# Patient Record
Sex: MALE | Race: WHITE | NOT HISPANIC OR LATINO | Employment: FULL TIME | URBAN - NONMETROPOLITAN AREA
[De-identification: names, ages, dates, MRNs, and addresses within clinical notes are randomized per-mention and may not be internally consistent; named-entity substitution may affect disease eponyms.]

---

## 2017-01-12 ENCOUNTER — HOSPITAL ENCOUNTER (EMERGENCY)
Facility: HOSPITAL | Age: 22
Discharge: HOME | End: 2017-01-12
Attending: EMERGENCY MEDICINE

## 2017-01-12 VITALS
OXYGEN SATURATION: 100 % | SYSTOLIC BLOOD PRESSURE: 131 MMHG | WEIGHT: 158 LBS | RESPIRATION RATE: 18 BRPM | HEART RATE: 81 BPM | TEMPERATURE: 98 F | DIASTOLIC BLOOD PRESSURE: 82 MMHG

## 2017-01-12 DIAGNOSIS — S00.411A ABRASION OF RIGHT EAR CANAL, INITIAL ENCOUNTER: Primary | ICD-10-CM

## 2017-01-12 PROCEDURE — 99282 EMERGENCY DEPT VISIT SF MDM: CPT

## 2017-01-12 RX ADMIN — LIDOCAINE HYDROCHLORIDE 15 ML: 20 SOLUTION ORAL; TOPICAL at 08:32

## 2017-08-17 ENCOUNTER — HOSPITAL ENCOUNTER (EMERGENCY)
Facility: HOSPITAL | Age: 22
Discharge: HOME | End: 2017-08-17
Attending: EMERGENCY MEDICINE | Admitting: EMERGENCY MEDICINE
Payer: COMMERCIAL

## 2017-08-17 VITALS
WEIGHT: 152 LBS | HEART RATE: 98 BPM | SYSTOLIC BLOOD PRESSURE: 135 MMHG | DIASTOLIC BLOOD PRESSURE: 80 MMHG | OXYGEN SATURATION: 100 % | RESPIRATION RATE: 18 BRPM | TEMPERATURE: 98.3 F

## 2017-08-17 DIAGNOSIS — S39.012A LUMBAR SPINE STRAIN: Primary | ICD-10-CM

## 2017-08-17 PROCEDURE — 99283 EMERGENCY DEPT VISIT LOW MDM: CPT

## 2017-08-17 RX ORDER — METHOCARBAMOL 500 MG/1
750 TABLET, FILM COATED ORAL EVERY 6 HOURS PRN
Status: DISCONTINUED | OUTPATIENT
Start: 2017-08-17 | End: 2017-08-17 | Stop reason: HOSPADM

## 2017-08-17 RX ORDER — METHOCARBAMOL 750 MG/1
750 TABLET, FILM COATED ORAL 4 TIMES DAILY PRN
Qty: 30 TABLET | Refills: 0 | Status: SHIPPED | OUTPATIENT
Start: 2017-08-17 | End: 2017-11-16

## 2017-08-17 RX ADMIN — METHOCARBAMOL 750 MG: 500 TABLET ORAL at 20:30

## 2017-11-16 ENCOUNTER — HOSPITAL ENCOUNTER (EMERGENCY)
Facility: HOSPITAL | Age: 22
Discharge: HOME | End: 2017-11-16
Admitting: EMERGENCY MEDICINE
Payer: COMMERCIAL

## 2017-11-16 ENCOUNTER — APPOINTMENT (EMERGENCY)
Dept: CT IMAGING | Facility: HOSPITAL | Age: 22
End: 2017-11-16
Payer: COMMERCIAL

## 2017-11-16 VITALS
WEIGHT: 150.2 LBS | HEIGHT: 68 IN | TEMPERATURE: 98.2 F | BODY MASS INDEX: 22.76 KG/M2 | HEART RATE: 78 BPM | RESPIRATION RATE: 17 BRPM | DIASTOLIC BLOOD PRESSURE: 81 MMHG | SYSTOLIC BLOOD PRESSURE: 118 MMHG | OXYGEN SATURATION: 98 %

## 2017-11-16 DIAGNOSIS — N20.0 RENAL CALCULI: Primary | ICD-10-CM

## 2017-11-16 LAB
ALBUMIN SERPL BCP-MCNC: 4.5 G/DL (ref 3.5–5)
ALP SERPL-CCNC: 103 U/L (ref 46–116)
ALT SERPL W P-5'-P-CCNC: 31 U/L (ref 12–78)
ANION GAP SERPL CALCULATED.3IONS-SCNC: 8 MMOL/L (ref 4–13)
AST SERPL W P-5'-P-CCNC: 21 U/L (ref 5–45)
BACTERIA UR QL AUTO: ABNORMAL /HPF
BASOPHILS # BLD AUTO: 0.04 THOUSANDS/ΜL (ref 0–0.1)
BASOPHILS NFR BLD AUTO: 1 % (ref 0–1)
BILIRUB SERPL-MCNC: 0.4 MG/DL (ref 0.2–1)
BILIRUB UR QL STRIP: NEGATIVE
BUN SERPL-MCNC: 21 MG/DL (ref 5–25)
CALCIUM SERPL-MCNC: 9.7 MG/DL (ref 8.3–10.1)
CHLORIDE SERPL-SCNC: 102 MMOL/L (ref 100–108)
CLARITY UR: CLEAR
CO2 SERPL-SCNC: 31 MMOL/L (ref 21–32)
COLOR UR: ABNORMAL
CREAT SERPL-MCNC: 1.09 MG/DL (ref 0.6–1.3)
EOSINOPHIL # BLD AUTO: 0.19 THOUSAND/ΜL (ref 0–0.61)
EOSINOPHIL NFR BLD AUTO: 3 % (ref 0–6)
ERYTHROCYTE [DISTWIDTH] IN BLOOD BY AUTOMATED COUNT: 13.7 % (ref 11.6–15.1)
GFR SERPL CREATININE-BSD FRML MDRD: 94 ML/MIN/1.73SQ M
GLUCOSE SERPL-MCNC: 85 MG/DL (ref 65–140)
GLUCOSE UR STRIP-MCNC: NEGATIVE MG/DL
HCT VFR BLD AUTO: 43.5 % (ref 36.5–49.3)
HGB BLD-MCNC: 15.4 G/DL (ref 12–17)
HGB UR QL STRIP.AUTO: ABNORMAL
HOLD SPECIMEN: NORMAL
KETONES UR STRIP-MCNC: NEGATIVE MG/DL
LEUKOCYTE ESTERASE UR QL STRIP: NEGATIVE
LIPASE SERPL-CCNC: 106 U/L (ref 73–393)
LYMPHOCYTES # BLD AUTO: 2.02 THOUSANDS/ΜL (ref 0.6–4.47)
LYMPHOCYTES NFR BLD AUTO: 29 % (ref 14–44)
MCH RBC QN AUTO: 28.3 PG (ref 26.8–34.3)
MCHC RBC AUTO-ENTMCNC: 35.4 G/DL (ref 31.4–37.4)
MCV RBC AUTO: 80 FL (ref 82–98)
MONOCYTES # BLD AUTO: 0.49 THOUSAND/ΜL (ref 0.17–1.22)
MONOCYTES NFR BLD AUTO: 7 % (ref 4–12)
NEUTROPHILS # BLD AUTO: 4.21 THOUSANDS/ΜL (ref 1.85–7.62)
NEUTS SEG NFR BLD AUTO: 60 % (ref 43–75)
NITRITE UR QL STRIP: NEGATIVE
NON-SQ EPI CELLS URNS QL MICRO: ABNORMAL /HPF
PH UR STRIP.AUTO: 6 [PH] (ref 4.5–8)
PLATELET # BLD AUTO: 186 THOUSANDS/UL (ref 149–390)
PMV BLD AUTO: 9.5 FL (ref 8.9–12.7)
POTASSIUM SERPL-SCNC: 3.4 MMOL/L (ref 3.5–5.3)
PROT SERPL-MCNC: 8.5 G/DL (ref 6.4–8.2)
PROT UR STRIP-MCNC: NEGATIVE MG/DL
RBC # BLD AUTO: 5.45 MILLION/UL (ref 3.88–5.62)
RBC #/AREA URNS AUTO: ABNORMAL /HPF
SODIUM SERPL-SCNC: 141 MMOL/L (ref 136–145)
SP GR UR STRIP.AUTO: 1.01 (ref 1–1.03)
UROBILINOGEN UR QL STRIP.AUTO: 0.2 E.U./DL
WBC # BLD AUTO: 6.95 THOUSAND/UL (ref 4.31–10.16)
WBC #/AREA URNS AUTO: ABNORMAL /HPF

## 2017-11-16 PROCEDURE — 74176 CT ABD & PELVIS W/O CONTRAST: CPT

## 2017-11-16 PROCEDURE — 80053 COMPREHEN METABOLIC PANEL: CPT | Performed by: PHYSICIAN ASSISTANT

## 2017-11-16 PROCEDURE — 85025 COMPLETE CBC W/AUTO DIFF WBC: CPT | Performed by: PHYSICIAN ASSISTANT

## 2017-11-16 PROCEDURE — 36415 COLL VENOUS BLD VENIPUNCTURE: CPT | Performed by: PHYSICIAN ASSISTANT

## 2017-11-16 PROCEDURE — 83690 ASSAY OF LIPASE: CPT | Performed by: PHYSICIAN ASSISTANT

## 2017-11-16 PROCEDURE — 99284 EMERGENCY DEPT VISIT MOD MDM: CPT

## 2017-11-16 PROCEDURE — 81001 URINALYSIS AUTO W/SCOPE: CPT | Performed by: PHYSICIAN ASSISTANT

## 2017-11-16 RX ORDER — HYDROCODONE BITARTRATE AND ACETAMINOPHEN 5; 325 MG/1; MG/1
1 TABLET ORAL EVERY 6 HOURS PRN
Qty: 8 TABLET | Refills: 0 | Status: SHIPPED | OUTPATIENT
Start: 2017-11-16 | End: 2017-11-26

## 2017-11-16 RX ORDER — TAMSULOSIN HYDROCHLORIDE 0.4 MG/1
0.4 CAPSULE ORAL
Qty: 7 CAPSULE | Refills: 0 | Status: SHIPPED | OUTPATIENT
Start: 2017-11-16

## 2017-11-16 RX ORDER — TAMSULOSIN HYDROCHLORIDE 0.4 MG/1
0.4 CAPSULE ORAL ONCE
Status: COMPLETED | OUTPATIENT
Start: 2017-11-16 | End: 2017-11-16

## 2017-11-16 RX ADMIN — TAMSULOSIN HYDROCHLORIDE 0.4 MG: 0.4 CAPSULE ORAL at 20:48

## 2017-11-17 NOTE — ED PROVIDER NOTES
History  Chief Complaint   Patient presents with    Flank Pain     Pt reports left flank pain started this evening  Pt reports hx of kidney stones and "it felt similar to that pain"     Patient presents to the emergency department today offering a history of left flank pain radiating into the left lower portion of the abdomen  Patient states the symptoms commenced earlier today when he woke up  Symptoms have since dissipated  He denies any urinary urgency frequency or burning  He denies incontinence of urine or stool  Denies lower extremity pain  No history of fever  History of renal calculi  None       Past Medical History:   Diagnosis Date    Kidney stones        Past Surgical History:   Procedure Laterality Date    EYE SURGERY      TONSILLECTOMY AND ADENOIDECTOMY         History reviewed  No pertinent family history  I have reviewed and agree with the history as documented  Social History   Substance Use Topics    Smoking status: Never Smoker    Smokeless tobacco: Never Used    Alcohol use Yes      Comment: occasionally        Review of Systems   Constitutional: Negative  HENT: Negative  Eyes: Negative  Respiratory: Negative  Cardiovascular: Negative  Gastrointestinal: Positive for abdominal pain  Endocrine: Negative  Genitourinary: Negative  Musculoskeletal: Positive for back pain  Left flank pain   Skin: Negative  Allergic/Immunologic: Negative  Neurological: Negative  Hematological: Negative  Psychiatric/Behavioral: Negative  All other systems reviewed and are negative        Physical Exam  ED Triage Vitals [11/16/17 1819]   Temperature Pulse Respirations Blood Pressure SpO2   98 2 °F (36 8 °C) 77 17 117/77 99 %      Temp Source Heart Rate Source Patient Position - Orthostatic VS BP Location FiO2 (%)   Temporal Monitor Sitting Left arm --      Pain Score       6           Orthostatic Vital Signs  Vitals:    11/16/17 1819 11/16/17 1845 11/16/17 1900 11/16/17 1930   BP: 117/77 124/80 114/78 113/76   Pulse: 77 89 75 78   Patient Position - Orthostatic VS: Sitting Sitting Lying Lying       Physical Exam   Constitutional: He is oriented to person, place, and time  He appears well-developed and well-nourished  No distress  HENT:   Head: Normocephalic and atraumatic  Eyes: Pupils are equal, round, and reactive to light  Neck: Normal range of motion  Cardiovascular: Normal rate and regular rhythm  Exam reveals no gallop and no friction rub  No murmur heard  Pulmonary/Chest: Effort normal and breath sounds normal  No respiratory distress  He has no wheezes  He has no rales  He exhibits no tenderness  Abdominal: Soft  Bowel sounds are normal  He exhibits no distension and no mass  There is no tenderness  There is no rebound and no guarding  No hernia  No CVA tenderness   Musculoskeletal: Normal range of motion  He exhibits no edema, tenderness or deformity  Neurological: He is alert and oriented to person, place, and time  Skin: Skin is warm  Capillary refill takes less than 2 seconds  He is not diaphoretic  Psychiatric: He has a normal mood and affect  Vitals reviewed        ED Medications  Medications   tamsulosin (FLOMAX) capsule 0 4 mg (not administered)       Diagnostic Studies  Results Reviewed     Procedure Component Value Units Date/Time    Comprehensive metabolic panel [[id number]]  (Abnormal) Collected:  11/16/17 1842    Lab Status:  Final result Specimen:  Blood from Arm, Right Updated:  11/16/17 1907     Sodium 141 mmol/L      Potassium 3 4 (L) mmol/L      Chloride 102 mmol/L      CO2 31 mmol/L      Anion Gap 8 mmol/L      BUN 21 mg/dL      Creatinine 1 09 mg/dL      Glucose 85 mg/dL      Calcium 9 7 mg/dL      AST 21 U/L      ALT 31 U/L      Alkaline Phosphatase 103 U/L      Total Protein 8 5 (H) g/dL      Albumin 4 5 g/dL      Total Bilirubin 0 40 mg/dL      eGFR 94 ml/min/1 73sq m     Narrative:         National Kidney Disease Education Program recommendations are as follows:  GFR calculation is accurate only with a steady state creatinine  Chronic Kidney disease less than 60 ml/min/1 73 sq  meters  Kidney failure less than 15 ml/min/1 73 sq  meters      Lipase [[id number]]  (Normal) Collected:  11/16/17 1842    Lab Status:  Final result Specimen:  Blood from Arm, Right Updated:  11/16/17 1907     Lipase 106 u/L     Urine Microscopic [[id number]]  (Abnormal) Collected:  11/16/17 1843    Lab Status:  Final result Specimen:  Urine from Urine, Clean Catch Updated:  11/16/17 1858     RBC, UA 10-20 (A) /hpf      WBC, UA None Seen /hpf      Epithelial Cells Occasional /hpf      Bacteria, UA Occasional /hpf     UA w Reflex to Microscopic w Reflex to Culture [[id number]]  (Abnormal) Collected:  11/16/17 1843    Lab Status:  Final result Specimen:  Urine from Urine, Clean Catch Updated:  11/16/17 1852     Color, UA Light Yellow     Clarity, UA Clear     Specific Gravity, UA 1 010     pH, UA 6 0     Leukocytes, UA Negative     Nitrite, UA Negative     Protein, UA Negative mg/dl      Glucose, UA Negative mg/dl      Ketones, UA Negative mg/dl      Urobilinogen, UA 0 2 E U /dl      Bilirubin, UA Negative     Blood, UA Large (A)    CBC and differential [[id number]]  (Abnormal) Collected:  11/16/17 1842    Lab Status:  Final result Specimen:  Blood from Arm, Right Updated:  11/16/17 1850     WBC 6 95 Thousand/uL      RBC 5 45 Million/uL      Hemoglobin 15 4 g/dL      Hematocrit 43 5 %      MCV 80 (L) fL      MCH 28 3 pg      MCHC 35 4 g/dL      RDW 13 7 %      MPV 9 5 fL      Platelets 323 Thousands/uL      Neutrophils Relative 60 %      Lymphocytes Relative 29 %      Monocytes Relative 7 %      Eosinophils Relative 3 %      Basophils Relative 1 %      Neutrophils Absolute 4 21 Thousands/µL      Lymphocytes Absolute 2 02 Thousands/µL      Monocytes Absolute 0 49 Thousand/µL      Eosinophils Absolute 0 19 Thousand/µL      Basophils Absolute 0 04 Thousands/µL                  CT renal stone study abdomen pelvis wo contrast   Final Result by Maxi Marley MD (11/16 2018)      Mild left-sided hydronephrosis, the cause which appears to be a 5 mm calculus within the proximal ureter  Bilateral subcentimeter nonobstructing intrarenal calculi  Workstation performed: [id number]                    Procedures  Procedures       Phone Contacts  ED Phone Contact    ED Course  ED Course as of Nov 16 2035   Thu Nov 16, 2017   1939 Sodium: [address] Potassium: (!) 3 4   1939 Chloride: 102   1939 CO2: 31   1939 Anion Gap: 8   1939 BUN: 21   1939 Creatinine: 1 09   1940 Glucose: 85   1940 Calcium: 9 7   1940 AST: 21   1940 ALT: 31   1940 Alkaline Phosphatase: 103   1940 Total Protein: (!) 8 5                               MDM  CritCare Time    Disposition  Final diagnoses:   Renal calculi     Time reflects when diagnosis was documented in both MDM as applicable and the Disposition within this note     Time User Action Codes Description Comment    11/16/2017  8:28 PM July HERNANDEZ Add [N20 0] Renal calculi       ED Disposition     ED Disposition Condition Comment    Discharge  [address] [facility] discharge to home/self care      Condition at discharge: Good        Follow-up Information     Follow up With Specialties Details [facility] [address]  for Urology at [address] [address] [facility] [facility] [address]  [address] [place] [phone]        Patient's Medications   Discharge Prescriptions    HYDROCODONE-ACETAMINOPHEN (NORCO) 5-325 MG PER TABLET    Take 1 tablet by mouth every 6 (six) hours as needed for pain for up to 10 days Max Daily Amount: 4 tablets       Start Date: 11/16/2017End Date: 11/26/2017       Order Dose: 1 tablet       Quantity: 8 tablet    Refills: 0    TAMSULOSIN (FLOMAX) 0 4 MG    Take 1 capsule by mouth daily with dinner       Start Date: 11/16/2017End Date: --       Order Dose: 0 4 mg Quantity: 7 capsule    Refills: 0     No discharge procedures on file      ED Provider  Electronically Signed by           Cait Dyer PA-C  11/16/17 1498

## 2017-11-17 NOTE — DISCHARGE INSTRUCTIONS
How to Strain Your Urine   WHAT YOU NEED TO KNOW:   Urinate into a strainer (funnel with a fine mesh on the bottom) or glass jar to collect kidney stones  DISCHARGE INSTRUCTIONS:   Medicines:   · Pain medicine: You may be given medicine to take away or decrease pain  Do not wait until the pain is severe before you take your medicine  · NSAIDs:  These medicines decrease swelling, pain, and fever  NSAIDs are available without a doctor's order  Ask which medicine is right for you  Ask how much to take and when to take it  Take as directed  NSAIDs can cause stomach bleeding and kidney problems if not taken correctly  · Nausea medicine: This medicine calms your stomach and prevents or controls vomiting  · Take your medicine as directed  Contact your healthcare provider if you think your medicine is not helping or if you have side effects  Tell him of her if you are allergic to any medicine  Keep a list of the medicines, vitamins, and herbs you take  Include the amounts, and when and why you take them  Bring the list or the pill bottles to follow-up visits  Carry your medicine list with you in case of an emergency  Drink liquids as directed:  Drink about 3 liters of liquids each day, or as directed  That equals about 12 glasses of water or fruit juice  Half of your total daily liquids should be water  Limit coffee, tea, and soda to 2 cups daily  Your urine will be pale and clear if you are drinking enough liquid  Self-care:   · Activity:  Exercise, such as walking, may help decrease your pain  · Avoid heat:  Heat may cause you to sweat, urinate less, and become dehydrated  Follow up with your healthcare provider or urologist as directed:  Write down your questions so you remember to ask them during your visits  Contact your healthcare provider or urologist if:   · You have a fever and chills  · Your urine looks cloudy or has a bad smell  · You have burning pain when you urinate       · You have trouble urinating  · You are vomiting and it does not get better, even after you take medicine  · You have questions or concerns about your condition or care  Return to the emergency department if:   · You are not able to urinate  · You have severe pain in your lower abdomen or side  · Your heart flutters or beats faster than usual   © 2017 [address] [employer] Information is for End User's use only and may not be sold, redistributed or otherwise used for commercial purposes  All illustrations and images included in CareNotes® are the copyrighted property of A D A M , Inc  or Ahsan Klein  The above information is an  only  It is not intended as medical advice for individual conditions or treatments  Talk to your doctor, nurse or pharmacist before following any medical regimen to see if it is safe and effective for you  Kidney Stones   WHAT YOU NEED TO KNOW:   Kidney stones form in the urinary system when the water and waste in your urine are out of balance  When this happens, certain types of waste crystals separate from the urine  The crystals build up and form kidney stones  You may have 1 or more kidney stones  DISCHARGE INSTRUCTIONS:   Return to the emergency department if:   · You have vomiting that is not relieved by medicine  Contact your healthcare provider if:   · You have a fever  · You have trouble passing urine  · You see blood in your urine  · You have severe pain  · You have any questions or concerns about your condition or care  Medicines:   · NSAIDs , such as ibuprofen, help decrease swelling, pain, and fever  This medicine is available with or without a doctor's order  NSAIDs can cause stomach bleeding or kidney problems in certain people  If you take blood thinner medicine, always ask your healthcare provider if NSAIDs are safe for you  Always read the medicine label and follow directions      · Prescription medicine  may be given  Ask how to take this medicine safely  · Medicines  to balance your electrolytes may be needed  · Take your medicine as directed  Contact your healthcare provider if you think your medicine is not helping or if you have side effects  Tell him or her if you are allergic to any medicine  Keep a list of the medicines, vitamins, and herbs you take  Include the amounts, and when and why you take them  Bring the list or the pill bottles to follow-up visits  Carry your medicine list with you in case of an emergency  Follow up with your healthcare provider as directed: You may need to return for more tests  Write down your questions so you remember to ask them during your visits  Self-care:   · Drink plenty of liquids  Your healthcare provider may tell you to drink at least 8 to 12 (eight-ounce) cups of liquids each day  This helps flush out the kidney stones when you urinate  Water is the best liquid to drink  · Strain your urine every time you go to the bathroom  Urinate through a strainer or a piece of thin cloth to catch the stones  Take the stones to your healthcare provider so they can be sent to the lab for tests  This will help your healthcare providers plan the best treatment for you  · Eat a variety of healthy foods  Healthy foods include fruits, vegetables, whole-grain breads, low-fat dairy products, beans, and fish  You may need to limit how much sodium (salt) or protein you eat  Ask for information about the best foods for you  · Stay active  Your stones may pass more easily by if you stay active  Ask about the best activities for you  After you pass your kidney stones:  Once you have passed your kidney stones, your healthcare provider may  order a 24-hour urine test  Results from a 24-hour urine test will help your healthcare provider plan ways to prevent more stones from forming   If you are told to do a 24-hour test, your healthcare provider will give you more instructions  © 2017 [address] [facility] Information is for End User's use only and may not be sold, redistributed or otherwise used for commercial purposes  All illustrations and images included in CareNotes® are the copyrighted property of A D A M , Inc  or Ahsan Klein  The above information is an  only  It is not intended as medical advice for individual conditions or treatments  Talk to your doctor, nurse or pharmacist before following any medical regimen to see if it is safe and effective for you

## 2017-11-22 ENCOUNTER — TRANSCRIBE ORDERS (OUTPATIENT)
Dept: ADMINISTRATIVE | Facility: HOSPITAL | Age: 22
End: 2017-11-22

## 2017-11-22 ENCOUNTER — ALLSCRIPTS OFFICE VISIT (OUTPATIENT)
Dept: OTHER | Facility: OTHER | Age: 22
End: 2017-11-22

## 2017-11-22 DIAGNOSIS — N20.0 CALCULUS OF KIDNEY: Primary | ICD-10-CM

## 2017-11-22 LAB
CLARITY UR: NORMAL
COLOR UR: YELLOW
GLUCOSE (HISTORICAL): NORMAL
HGB UR QL STRIP.AUTO: NORMAL
KETONES UR STRIP-MCNC: NORMAL MG/DL
LEUKOCYTE ESTERASE UR QL STRIP: NORMAL
NITRITE UR QL STRIP: NORMAL
PH UR STRIP.AUTO: 6 [PH]
PROT UR STRIP-MCNC: NORMAL MG/DL
SP GR UR STRIP.AUTO: 1.02

## 2017-11-23 NOTE — CONSULTS
Assessment  1  Calculus of kidney (592 0) (N20 0)    Plan  Calculus of kidney    · Tamsulosin HCl - 0 4 MG Oral Capsule (Flomax); TAKE 1 CAPSULE Bedtime   Rx By: Karthik Sheldon; Dispense: 0 Days ; #:30 Capsule; Refill: 1;For: Calculus of kidney; CHRISTOS = N; Verified Transmission to [facility]; Last Updated By: System[employer]; 11/22/2017 3:02:38 PM   · * XR ABDOMEN 1 VIEW KUB; Status:Active; Requested for:[id number];    Perform:[facility]; GTI:[id number]; Ordered;of kidney; Ordered By:Grayson Alvarez;   · Urine Dip Non-Automated- POC; Status:Complete - Retrospective By ProtocolAuthorization;   Done: [id number] 02:40PM   Performed: In Office; [phone]; Last Updated By:Marianne Morris; 11/22/2017 2:41:01 PM;Ordered; For:Calculus of kidney; Ordered By:Grayson Alvarez;   · [facility]; Status:Hold For - Scheduling; Requested for:[id number];    Perform:[facility]; Order Comments:with ureteral jets; QCJ:[id number]; Ordered;of kidney; Ordered By:Grayson Alvarez;   · Follow-up visit in 1 month Evaluation and Treatment  Follow-up-with Margie  Status: HoldFor - Scheduling  Requested for: [id number]   Ordered;Calculus of kidney; Ordered By: Karthik Sheldon Performed:  Due: [id number]    Discussion/Summary  Discussion Summary:   Patient has a proximal stone which given his symptoms appears to be moving down towards the bladder  I have given the patient urine strainers and a cup should he pass his stone  Patient is on Flomax currently and needs a refill which I have provided  The patient would like to try to pass stone on his own as he has been able to do in the past  As such, I will see him back in 1 month with an updated x-ray and ultrasound  reviewed with the patient urgent stones symptoms that should prompt emergent evaluation   These include intractable flank pain that does not respond to oral medications, nausea and vomiting that prevents intake of oral fluids, fevers greater than 100 3 measured by thermometer, or large amounts of blood in the urine  reviewed with the patient some general lifestyle and dietary modifications that can improve and prevent stone formation  This includes a large volume of water intake, approximately 1 5-2 L per day  Addition of citric acid with homemade lemonade or orange juice can help to it inhibit stone formation  Decreased sodium in the form of table salt and as sodium in prepared foods as well as decreased animal protein with approximately 1 palm-sized portion per meal can both lead to decrease in overall stone formation  the patient is able to maintain some of these lifestyle modifications and still has recurrent stone formation we discussed the need for a metabolic stone evaluation  patient does not like to use narcotic medication and uses Motrin and tramadol  I have not given him any prescriptions today other than the Flomax  Chief Complaint  Chief Complaint Free Text Note Form: Pt presents to office for consult for nephrolithiasis  Review of Systems  Complete-Male Urology:  Genitourinary: Empty sensation-- and-- stream quality good, but-- no dysuria,-- no urinary hesitancy,-- no hematuria,-- no incontinence-- and-- no feelings of urinary urgency--   The patient presents with complaints of episodes of nocturia  Episodes have been occurring 2 times  Active Problems  1  Calculus of kidney (592 0) (N20 0)   2  Cystic acne (706 1) (L70 0)   3  Fungal nail infection (110 1) (B35 1)   4  Need for influenza vaccination (V04 81) (Z23)   5  Pityriasis rosea (696 3) (L42)    Surgical History    1  History of Eye Surgery   2  History of Tonsillectomy With Adenoidectomy    Family History  Mother    1  Family history of Junior-Danlos syndrome   2  Family history of arthritis (V17 7) (Z82 61)  Father    3  Family history of type 2 diabetes mellitus (V18 0) (Z83 3)   4  Family history of Healthy adult  Grandparent    5   Family history of type 2 diabetes mellitus (V18 0) (Z83 3)    Social History     · Never a smoker   · No alcohol use   · Single    Current Meds   1  Flomax CAPS; Therapy: (Recorded:22Nov2017) to Recorded    Allergies  1  No Known Drug Allergies    Vitals  Vital Signs    Recorded: 22Nov2017 02:33PM   Heart Rate 80   Respiration 16   Systolic 964   Diastolic 70   Height 5 ft 9 in   Weight 152 lb    BMI Calculated 22 45   BSA Calculated 1 84       Physical Exam   Constitutional  General appearance: No acute distress, well appearing and well nourished  Pulmonary  Respiratory effort: No increased work of breathing or signs of respiratory distress  Cardiovascular  Examination of extremities for edema and/or varicosities: Normal    Abdomen  Abdomen: Non-tender, no masses  Musculoskeletal  Gait and station: Normal    Skin  Skin and subcutaneous tissue: Normal without rashes or lesions  Results/Data  Urine Dip Non-Automated- POC [id number] 02:40PM Jerrell Sams     Test Name Result Flag Reference   Color Yellow       Clarity Transparent     Leukocytes neg     Nitrite neg     Blood neg     Protein neg     Ph 6     Specific Gravity 1 020     Ketone neg     Glucose neg         Diagnostic Studies Reviewed Urology:  CT Scan Review CT ABDOMEN AND PELVIS WITHOUT IV CONTRAST - LOW DOSE RENAL STONELeft lower quadrant pain  None  Low dose thin section CT examination of the abdomen and pelvis was performed without intravenous or oral contrast according to a protocol specifically designed to evaluate for urinary tract calculus  Reformatted images were created in axial,  sagittal, and coronal planes  Evaluation for pathology in the abdomen and pelvis that is unrelated to urinary tract calculi is limited  dose length product (DLP) for this visit: 134 23 mGy-cm   This examination, like all CT scans performed in the [facility], was performed utilizing techniques to minimize radiation dose exposure, including the use of iterative  reconstruction and automated exposure control  KIDNEY AND URETER:are multiple nonobstructing intrarenal calculi measuring no greater than 3 mm  There is a 4 5 cm lower pole cyst relates a focus of thin peripheral calcification  hydronephrosis or hydroureter  No perinephric collection  KIDNEY AND URETER:is mild hydronephrosis, the cause of which appears to be a 5 mm calculus within the proximal ureter, just distal to the UPJ hydronephrosis or hydroureter  No perinephric collection  BLADDER:significant abnormality in the visualized lung bases  low radiation dose noncontrast CT evaluation demonstrates no clinically significant abnormality of liver, spleen, pancreas, or adrenal glands  calcified gallstones or gallbladder wall thickening noted  ascites or lymphadenopathy  loops appear unremarkable  appendix is well seen and there is no evidence of acute appendicitis  acute fracture or destructive osseous lesion is identified  left-sided hydronephrosis, the cause which appears to be a 5 mm calculus within the proximal ureter  subcentimeter nonobstructing intrarenal calculi     Lab Studies Reviewed: Urine [id number] - Reflex for Order [id number] result Â Â Visible to patient: No (Not Released) Next appt: None  Ref Range & Units 11/16/17 1843 FlagUA None Seen, 0-5 /hpf 10-20  AUA None Seen, 0-5, 5-55, 5-65 /hpf Â None SeenCells None Seen, Occasional /hpf Â OccasionalUA None Seen, Occasional /hpf Â Occasional  Collected: 11/16/17 18:43 Last Resulted: 11/16/17 18:58             Signatures   Electronically signed by : JAYLENE Rossi ; Nov 22 2017  3:03PM EST                       (Author)

## 2017-12-28 ENCOUNTER — HOSPITAL ENCOUNTER (OUTPATIENT)
Dept: ULTRASOUND IMAGING | Facility: HOSPITAL | Age: 22
Discharge: HOME | End: 2017-12-28
Attending: UROLOGY
Payer: COMMERCIAL

## 2017-12-28 DIAGNOSIS — N20.0 CALCULUS OF KIDNEY: ICD-10-CM

## 2017-12-28 PROCEDURE — 76770 US EXAM ABDO BACK WALL COMP: CPT

## 2018-01-01 ENCOUNTER — APPOINTMENT (EMERGENCY)
Dept: CT IMAGING | Facility: HOSPITAL | Age: 23
End: 2018-01-01
Payer: COMMERCIAL

## 2018-01-01 ENCOUNTER — HOSPITAL ENCOUNTER (EMERGENCY)
Facility: HOSPITAL | Age: 23
Discharge: HOME | End: 2018-01-02
Attending: EMERGENCY MEDICINE
Payer: COMMERCIAL

## 2018-01-01 DIAGNOSIS — N20.1 LEFT URETERAL CALCULUS: Primary | ICD-10-CM

## 2018-01-01 LAB
BASOPHILS # BLD AUTO: 0.04 THOUSANDS/ΜL (ref 0–0.1)
BASOPHILS NFR BLD AUTO: 1 % (ref 0–1)
BILIRUB UR QL STRIP: NEGATIVE
CLARITY UR: ABNORMAL
COLOR UR: YELLOW
EOSINOPHIL # BLD AUTO: 0.23 THOUSAND/ΜL (ref 0–0.61)
EOSINOPHIL NFR BLD AUTO: 3 % (ref 0–6)
ERYTHROCYTE [DISTWIDTH] IN BLOOD BY AUTOMATED COUNT: 14 % (ref 11.6–15.1)
GLUCOSE UR STRIP-MCNC: NEGATIVE MG/DL
HCT VFR BLD AUTO: 42.9 % (ref 36.5–49.3)
HGB BLD-MCNC: 15.1 G/DL (ref 12–17)
HGB UR QL STRIP.AUTO: ABNORMAL
KETONES UR STRIP-MCNC: NEGATIVE MG/DL
LEUKOCYTE ESTERASE UR QL STRIP: NEGATIVE
LYMPHOCYTES # BLD AUTO: 1.83 THOUSANDS/ΜL (ref 0.6–4.47)
LYMPHOCYTES NFR BLD AUTO: 26 % (ref 14–44)
MCH RBC QN AUTO: 28.5 PG (ref 26.8–34.3)
MCHC RBC AUTO-ENTMCNC: 35.2 G/DL (ref 31.4–37.4)
MCV RBC AUTO: 81 FL (ref 82–98)
MONOCYTES # BLD AUTO: 0.47 THOUSAND/ΜL (ref 0.17–1.22)
MONOCYTES NFR BLD AUTO: 7 % (ref 4–12)
NEUTROPHILS # BLD AUTO: 4.37 THOUSANDS/ΜL (ref 1.85–7.62)
NEUTS SEG NFR BLD AUTO: 63 % (ref 43–75)
NITRITE UR QL STRIP: NEGATIVE
PH UR STRIP.AUTO: 5.5 [PH] (ref 4.5–8)
PLATELET # BLD AUTO: 175 THOUSANDS/UL (ref 149–390)
PMV BLD AUTO: 9.4 FL (ref 8.9–12.7)
PROT UR STRIP-MCNC: ABNORMAL MG/DL
RBC # BLD AUTO: 5.3 MILLION/UL (ref 3.88–5.62)
SP GR UR STRIP.AUTO: >=1.03 (ref 1–1.03)
UROBILINOGEN UR QL STRIP.AUTO: 0.2 E.U./DL
WBC # BLD AUTO: 6.94 THOUSAND/UL (ref 4.31–10.16)

## 2018-01-01 PROCEDURE — 74176 CT ABD & PELVIS W/O CONTRAST: CPT

## 2018-01-01 PROCEDURE — 80053 COMPREHEN METABOLIC PANEL: CPT | Performed by: EMERGENCY MEDICINE

## 2018-01-01 PROCEDURE — 96361 HYDRATE IV INFUSION ADD-ON: CPT

## 2018-01-01 PROCEDURE — 96374 THER/PROPH/DIAG INJ IV PUSH: CPT

## 2018-01-01 PROCEDURE — 81001 URINALYSIS AUTO W/SCOPE: CPT | Performed by: EMERGENCY MEDICINE

## 2018-01-01 PROCEDURE — 85025 COMPLETE CBC W/AUTO DIFF WBC: CPT | Performed by: EMERGENCY MEDICINE

## 2018-01-01 PROCEDURE — 36415 COLL VENOUS BLD VENIPUNCTURE: CPT | Performed by: EMERGENCY MEDICINE

## 2018-01-01 RX ORDER — KETOROLAC TROMETHAMINE 30 MG/ML
30 INJECTION, SOLUTION INTRAMUSCULAR; INTRAVENOUS ONCE
Status: COMPLETED | OUTPATIENT
Start: 2018-01-01 | End: 2018-01-01

## 2018-01-01 RX ADMIN — SODIUM CHLORIDE 1000 ML: 0.9 INJECTION, SOLUTION INTRAVENOUS at 23:49

## 2018-01-01 RX ADMIN — KETOROLAC TROMETHAMINE 30 MG: 30 INJECTION, SOLUTION INTRAMUSCULAR at 23:50

## 2018-01-02 VITALS
SYSTOLIC BLOOD PRESSURE: 114 MMHG | HEART RATE: 82 BPM | WEIGHT: 152 LBS | TEMPERATURE: 98.5 F | OXYGEN SATURATION: 99 % | BODY MASS INDEX: 23.11 KG/M2 | DIASTOLIC BLOOD PRESSURE: 69 MMHG | RESPIRATION RATE: 16 BRPM

## 2018-01-02 LAB
ALBUMIN SERPL BCP-MCNC: 4.2 G/DL (ref 3.5–5)
ALP SERPL-CCNC: 91 U/L (ref 46–116)
ALT SERPL W P-5'-P-CCNC: 34 U/L (ref 12–78)
ANION GAP SERPL CALCULATED.3IONS-SCNC: 7 MMOL/L (ref 4–13)
AST SERPL W P-5'-P-CCNC: 27 U/L (ref 5–45)
BACTERIA UR QL AUTO: ABNORMAL /HPF
BILIRUB SERPL-MCNC: 0.6 MG/DL (ref 0.2–1)
BUN SERPL-MCNC: 28 MG/DL (ref 5–25)
CALCIUM SERPL-MCNC: 8.9 MG/DL (ref 8.3–10.1)
CHLORIDE SERPL-SCNC: 105 MMOL/L (ref 100–108)
CO2 SERPL-SCNC: 32 MMOL/L (ref 21–32)
CREAT SERPL-MCNC: 1.38 MG/DL (ref 0.6–1.3)
GFR SERPL CREATININE-BSD FRML MDRD: 71 ML/MIN/1.73SQ M
GLUCOSE SERPL-MCNC: 98 MG/DL (ref 65–140)
NON-SQ EPI CELLS URNS QL MICRO: ABNORMAL /HPF
POTASSIUM SERPL-SCNC: 4.2 MMOL/L (ref 3.5–5.3)
PROT SERPL-MCNC: 7.9 G/DL (ref 6.4–8.2)
RBC #/AREA URNS AUTO: ABNORMAL /HPF
SODIUM SERPL-SCNC: 144 MMOL/L (ref 136–145)
WBC #/AREA URNS AUTO: ABNORMAL /HPF

## 2018-01-02 PROCEDURE — 51798 US URINE CAPACITY MEASURE: CPT

## 2018-01-02 PROCEDURE — 99284 EMERGENCY DEPT VISIT MOD MDM: CPT

## 2018-01-02 NOTE — DISCHARGE INSTRUCTIONS
Ureteral Stones   WHAT YOU NEED TO KNOW:   A ureteral stone is a stone that forms in the kidney and moves down the ureter and gets stuck there  The ureter is the tube that takes urine from the kidney to the bladder  Stones can form in the urinary system when your urine has high levels of minerals and salts  Urinary stones can be made of uric acid, calcium, phosphate, or oxalate crystals  DISCHARGE INSTRUCTIONS:   Seek care immediately if:   · You have severe pain that does not improve, even after you take medicine  · You have vomiting that is not relieved by medicine  Contact your healthcare provider if:   · You develop a fever  · You have any questions or concerns about your condition or care  Follow up with your healthcare provider as directed: You may need to return for more tests  Write down your questions so you remember to ask them during your visits  Medicines: You may need any of the following:  · NSAIDs , such as ibuprofen, help decrease swelling, pain, and fever  This medicine is available with or without a doctor's order  NSAIDs can cause stomach bleeding or kidney problems in certain people  If you take blood thinner medicine, always ask your healthcare provider if NSAIDs are safe for you  Always read the medicine label and follow directions  · Prescription pain medicine  may help decrease pain or help your ureteral stone pass  Do not wait until the pain is severe before you take pain medicine  · Nausea medicine  may help calm your stomach and prevent vomiting  · Take your medicine as directed  Contact your healthcare provider if you think your medicine is not helping or if you have side effects  Tell him or her if you are allergic to any medicine  Keep a list of the medicines, vitamins, and herbs you take  Include the amounts, and when and why you take them  Bring the list or the pill bottles to follow-up visits   Carry your medicine list with you in case of an emergency  Self-care:   · Drink plenty of liquids  Your healthcare provider may tell you to drink at least 8 to 12 (eight-ounce) cups of liquids each day  This helps flush out the ureteral stones when you urinate  Water is the best liquid to drink  · Strain your urine every time you go to the bathroom  Urinate through a strainer or a piece of thin cloth to catch the stones  Take the stones to your healthcare provider so they can be sent to the lab for tests  This will help your healthcare providers plan the best treatment for you  · Ask your healthcare provider about any nutrition changes you need to make  You may need to limit certain foods such as foods high in sodium (salt), certain protein foods, or foods high in oxalate  After you pass your ureteral stone: Once you have passed your ureteral stone, you may need to do a 24-hour urine test  You may need to save all of your urine for 24 hours  Each time you go to the bathroom, you will urinate into a container  Then you will pour your urine into a larger container that is kept cold  You may be told to write down the time and amount of urine you passed  At the end of 24 hours, the urine is sent to a lab for tests  Results from the test will help your healthcare provider plan ways to prevent more stones from forming  © 2017 [facility] Information is for End User's use only and may not be sold, redistributed or otherwise used for commercial purposes  All illustrations and images included in CareNotes® are the copyrighted property of A D A M , Inc  or Ahsan Klein  The above information is an  only  It is not intended as medical advice for individual conditions or treatments  Talk to your doctor, nurse or pharmacist before following any medical regimen to see if it is safe and effective for you

## 2018-01-02 NOTE — ED PROVIDER NOTES
History  Chief Complaint   Patient presents with    Urinary Retention     pt stated existing kidney stone c/o unable to void tonight     38-year-old male previously diagnosed bilateral nephrolithiasis presents complaining of inability to fully urinate began tonight when he woke up for work  Patient does complain about minimal tenderness to palpation in the left flank  He denies nausea vomiting other symptoms  Patient had an ultrasound performed on 12/28 which demonstrated bilateral nephrolithiasis along with proximal left ureteral dilatation  Flank Pain   Pain location:  L flank  Pain quality: aching    Pain radiates to:  L flank  Pain severity:  Mild  Onset quality:  Gradual  Duration:  2 hours  Timing:  Constant  Progression:  Unchanged  Context: not alcohol use, not awakening from sleep and not diet changes    Relieved by:  Nothing  Worsened by:  Nothing  Ineffective treatments:  None tried  Associated symptoms: no anorexia, no belching, no chest pain, no chills and no constipation    Risk factors: no alcohol abuse and no aspirin use        Prior to Admission Medications   Prescriptions Last Dose Informant Patient Reported? Taking?   tamsulosin (FLOMAX) 0 4 mg   No Yes   Sig: Take 1 capsule by mouth daily with dinner      Facility-Administered Medications: None       Past Medical History:   Diagnosis Date    Kidney stones        Past Surgical History:   Procedure Laterality Date    EYE SURGERY      TONSILLECTOMY AND ADENOIDECTOMY         History reviewed  No pertinent family history  I have reviewed and agree with the history as documented  Social History   Substance Use Topics    Smoking status: Never Smoker    Smokeless tobacco: Never Used    Alcohol use Yes      Comment: occasionally        Review of Systems   Constitutional: Negative for activity change, appetite change and chills  HENT: Negative for congestion, dental problem, drooling and ear discharge      Eyes: Negative for discharge and itching  Respiratory: Negative for apnea, choking and chest tightness  Cardiovascular: Negative for chest pain  Gastrointestinal: Negative for anorexia and constipation  Endocrine: Negative for cold intolerance and heat intolerance  Genitourinary: Positive for flank pain  Musculoskeletal: Negative for arthralgias, back pain and gait problem  Skin: Negative for color change, pallor and rash  Allergic/Immunologic: Negative for environmental allergies and food allergies  Neurological: Negative for dizziness, facial asymmetry and headaches  Hematological: Negative for adenopathy  Psychiatric/Behavioral: Negative for agitation, behavioral problems and confusion  Physical Exam  ED Triage Vitals [01/01/18 2320]   Temperature Pulse Respirations Blood Pressure SpO2   98 5 °F (36 9 °C) 92 18 112/73 99 %      Temp Source Heart Rate Source Patient Position - Orthostatic VS BP Location FiO2 (%)   Temporal Monitor Sitting Left arm --      Pain Score       9           Orthostatic Vital Signs  Vitals:    01/01/18 2320   BP: 112/73   Pulse: 92   Patient Position - Orthostatic VS: Sitting       Physical Exam   Constitutional: He appears well-developed and well-nourished  HENT:   Head: Normocephalic  Eyes: Pupils are equal, round, and reactive to light  Right eye exhibits no discharge  Left eye exhibits no discharge  Neck: Normal range of motion  No tracheal deviation present  No thyromegaly present  Cardiovascular: Normal rate, regular rhythm and normal heart sounds  Pulmonary/Chest: Effort normal  No respiratory distress  He has no wheezes  He has no rales  Abdominal: He exhibits no distension  There is no tenderness  There is CVA tenderness  There is no guarding  Musculoskeletal: Normal range of motion  He exhibits no edema or deformity  Neurological: He is alert  He displays normal reflexes  No cranial nerve deficit  Coordination normal    Skin: Skin is warm  Capillary refill takes less than 2 seconds  No erythema  Psychiatric: He has a normal mood and affect  His behavior is normal  Thought content normal    Vitals reviewed  ED Medications  Medications   sodium chloride 0 9 % bolus 1,000 mL (1,000 mL Intravenous New Bag 1/1/18 2349)   ketorolac (TORADOL) injection 30 mg (30 mg Intravenous Given 1/1/18 2350)       Diagnostic Studies  Results Reviewed     Procedure Component Value Units Date/Time    Urine Microscopic [[id number]]  (Abnormal) Collected:  01/01/18 2351    Lab Status:  Final result Specimen:  Urine from Urine, Clean Catch Updated:  01/02/18 0005     RBC, UA Innumerable (A) /hpf      WBC, UA None Seen /hpf      Epithelial Cells None Seen /hpf      Bacteria, UA Moderate (A) /hpf     Comprehensive metabolic panel [[id number]]  (Abnormal) Collected:  01/01/18 2343    Lab Status:  Final result Specimen:  Blood from Arm, Right Updated:  01/02/18 0003     Sodium 144 mmol/L      Potassium 4 2 mmol/L      Chloride 105 mmol/L      CO2 32 mmol/L      Anion Gap 7 mmol/L      BUN 28 (H) mg/dL      Creatinine 1 38 (H) mg/dL      Glucose 98 mg/dL      Calcium 8 9 mg/dL      AST 27 U/L      ALT 34 U/L      Alkaline Phosphatase 91 U/L      Total Protein 7 9 g/dL      Albumin 4 2 g/dL      Total Bilirubin 0 60 mg/dL      eGFR 71 ml/min/1 73sq m     Narrative:         National Kidney Disease Education Program recommendations are as follows:  GFR calculation is accurate only with a steady state creatinine  Chronic Kidney disease less than 60 ml/min/1 73 sq  meters  Kidney failure less than 15 ml/min/1 73 sq  meters      UA w Reflex to Microscopic w Reflex to Culture [[id number]]  (Abnormal) Collected:  01/01/18 2351    Lab Status:  Final result Specimen:  Urine from Urine, Clean Catch Updated:  01/01/18 2356     Color, UA Yellow     Clarity, UA Cloudy     Specific Gravity, UA >=1 030     pH, UA 5 5     Leukocytes, UA Negative     Nitrite, UA Negative     Protein, UA Trace (A) mg/dl      Glucose, UA Negative mg/dl      Ketones, UA Negative mg/dl      Urobilinogen, UA 0 2 E U /dl      Bilirubin, UA Negative     Blood, UA Large (A)    CBC and differential [[id number]]  (Abnormal) Collected:  01/01/18 2343    Lab Status:  Final result Specimen:  Blood from Arm, Right Updated:  01/01/18 2349     WBC 6 94 Thousand/uL      RBC 5 30 Million/uL      Hemoglobin 15 1 g/dL      Hematocrit 42 9 %      MCV 81 (L) fL      MCH 28 5 pg      MCHC 35 2 g/dL      RDW 14 0 %      MPV 9 4 fL      Platelets 897 Thousands/uL      Neutrophils Relative 63 %      Lymphocytes Relative 26 %      Monocytes Relative 7 %      Eosinophils Relative 3 %      Basophils Relative 1 %      Neutrophils Absolute 4 37 Thousands/µL      Lymphocytes Absolute 1 83 Thousands/µL      Monocytes Absolute 0 47 Thousand/µL      Eosinophils Absolute 0 23 Thousand/µL      Basophils Absolute 0 04 Thousands/µL                  CT renal stone study abdomen pelvis without contrast    (Results Pending)              Procedures  Procedures       Phone Contacts  ED Phone Contact    ED Course  ED Course                                MDM  Number of Diagnoses or Management Options  Diagnosis management comments: Differential diagnosis 1  Ureterolithiasis 2    Nephrolithiasis 3   UTI  I will CT scan of the pelvis check labs    01:00   I spoke with Urology Dr Deven Radford of Urology and reviewed study - patient will follow up in the office        Amount and/or Complexity of Data Reviewed  Clinical lab tests: reviewed  Tests in the radiology section of CPT®: reviewed  Tests in the medicine section of CPT®: reviewed    Risk of Complications, Morbidity, and/or Mortality  Presenting problems: high  Diagnostic procedures: high  Management options: high      CritCare Time    Disposition  Final diagnoses:   Left ureteral calculus     Time reflects when diagnosis was documented in both MDM as applicable and the Disposition within this note     Time User Action Codes Description Comment    1/2/2018  1:07 AM Miriam Hewitt Add [N20 1] Left ureteral calculus       ED Disposition     ED Disposition Condition Comment    Discharge  [address] [facility] discharge to home/self care  Condition at discharge: Good        Follow-up Information     Follow up With Specialties Details Why Contact Info    Phyllis Travis MD Urology   [address]  [address] [facility] [address]  [phone]          Patient's Medications   Discharge Prescriptions    No medications on file     No discharge procedures on file      ED Provider  Electronically Signed by           Randa Oakley DO  01/02/18 8749

## 2018-01-13 VITALS
SYSTOLIC BLOOD PRESSURE: 110 MMHG | HEART RATE: 80 BPM | RESPIRATION RATE: 16 BRPM | BODY MASS INDEX: 22.51 KG/M2 | WEIGHT: 152 LBS | DIASTOLIC BLOOD PRESSURE: 70 MMHG | HEIGHT: 69 IN

## 2018-01-23 ENCOUNTER — GENERIC CONVERSION - ENCOUNTER (OUTPATIENT)
Dept: OTHER | Facility: OTHER | Age: 23
End: 2018-01-23

## 2018-01-24 NOTE — MISCELLANEOUS
Message   Recorded as Task   Date: 01/23/2018 12:43 PM, Created By: Miriam Walden   Task Name: Follow Up   Assigned To: Loretta Morris   Regarding Patient: INDIA MACK, Status: Active   Comment:    Miriam Iram - 23 Jan 2018 12:43 PM     TASK CREATED  Supposed to see Yamileth Sawyer in December  Cancelled his appt  Doesn't look like he rescheduled  Can we offer the patient a f/u in Feb/Mar with repeat renal/bladder US  If he refuses, can we just document it? Janine Quiros - 23 Jan 2018 3:53 PM     TASK EDITED  Spoke with pt's mother re r/s of appt and renal/bladder US  Imaging scheduled 2/19/2018 at 0930  Appt scheduled at [facility] with Margie SEARS 2/26/2018 AT 1000  Mother asked to schedule testing and appt and then mail info to her  Script and appt card mailed to pt's mother  Active Problems    1  Calculus of kidney (592 0) (N20 0)   2  Cystic acne (706 1) (L70 0)   3  Fungal nail infection (110 1) (B35 1)   4  Need for influenza vaccination (V04 81) (Z23)   5  Pityriasis rosea (696 3) (L42)    Current Meds   1  Flomax CAPS (Tamsulosin HCl); Therapy: (Recorded:22Nov2017) to Recorded   2  Tamsulosin HCl - 0 4 MG Oral Capsule (Flomax); TAKE 1 CAPSULE Bedtime; Therapy: [id number] to (Last Rx:22Nov2017)  Requested for: 22Nov2017 Ordered    Allergies    1  No Known Drug Allergies    Plan  Calculus of kidney    · US KIDNEY AND BLADDER; Status:Hold For - Scheduling,Exact Date;  Requested  for:Approx [id number] 09:30 AM;     Signatures   Electronically signed by : Jacinto Fallon, ; Jan 23 2018  3:53PM EST                       (Author)

## 2018-02-23 PROBLEM — N20.1 URETERAL CALCULI: Status: ACTIVE | Noted: 2018-02-23

## 2018-02-23 PROBLEM — N20.0 CALCULUS OF KIDNEY: Status: ACTIVE | Noted: 2018-02-23
